# Patient Record
Sex: FEMALE | Race: OTHER | Employment: FULL TIME | URBAN - METROPOLITAN AREA
[De-identification: names, ages, dates, MRNs, and addresses within clinical notes are randomized per-mention and may not be internally consistent; named-entity substitution may affect disease eponyms.]

---

## 2019-04-19 PROCEDURE — 87086 URINE CULTURE/COLONY COUNT: CPT | Performed by: PHYSICIAN ASSISTANT

## 2019-04-30 PROCEDURE — 87591 N.GONORRHOEAE DNA AMP PROB: CPT | Performed by: UROLOGY

## 2019-04-30 PROCEDURE — 87491 CHLMYD TRACH DNA AMP PROBE: CPT | Performed by: UROLOGY

## 2019-04-30 PROCEDURE — 87798 DETECT AGENT NOS DNA AMP: CPT | Performed by: UROLOGY

## 2019-04-30 PROCEDURE — 87086 URINE CULTURE/COLONY COUNT: CPT | Performed by: UROLOGY

## 2021-08-31 ENCOUNTER — APPOINTMENT (OUTPATIENT)
Dept: OCCUPATIONAL MEDICINE | Age: 46
End: 2021-08-31
Attending: FAMILY MEDICINE

## 2022-01-19 ENCOUNTER — OFFICE VISIT (OUTPATIENT)
Dept: ENDOCRINOLOGY CLINIC | Facility: CLINIC | Age: 47
End: 2022-01-19
Payer: COMMERCIAL

## 2022-01-19 VITALS
DIASTOLIC BLOOD PRESSURE: 75 MMHG | WEIGHT: 170 LBS | HEIGHT: 60 IN | HEART RATE: 83 BPM | BODY MASS INDEX: 33.38 KG/M2 | SYSTOLIC BLOOD PRESSURE: 135 MMHG | RESPIRATION RATE: 18 BRPM

## 2022-01-19 DIAGNOSIS — L68.0 HIRSUTISM: ICD-10-CM

## 2022-01-19 DIAGNOSIS — E66.09 CLASS 1 OBESITY DUE TO EXCESS CALORIES WITH SERIOUS COMORBIDITY AND BODY MASS INDEX (BMI) OF 33.0 TO 33.9 IN ADULT: ICD-10-CM

## 2022-01-19 DIAGNOSIS — L65.9 ALOPECIA: Primary | ICD-10-CM

## 2022-01-19 DIAGNOSIS — N92.6 IRREGULAR MENSES: ICD-10-CM

## 2022-01-19 DIAGNOSIS — L83 ACANTHOSIS NIGRICANS: ICD-10-CM

## 2022-01-19 PROBLEM — H04.123 DRY EYES: Status: ACTIVE | Noted: 2018-05-22

## 2022-01-19 PROBLEM — S96.911A SPRAIN AND STRAIN OF RIGHT ANKLE: Status: ACTIVE | Noted: 2020-12-17

## 2022-01-19 PROBLEM — R94.39 ABNORMAL STRESS TEST: Status: ACTIVE | Noted: 2020-12-17

## 2022-01-19 PROBLEM — N63.20 LEFT BREAST MASS: Status: ACTIVE | Noted: 2019-10-29

## 2022-01-19 PROBLEM — E78.1 HYPERTRIGLYCERIDEMIA: Status: ACTIVE | Noted: 2020-12-17

## 2022-01-19 PROBLEM — E55.9 VITAMIN D DEFICIENCY: Status: ACTIVE | Noted: 2021-09-27

## 2022-01-19 PROBLEM — S05.12XA CONTUSION OF LEFT ORBIT: Status: ACTIVE | Noted: 2020-12-17

## 2022-01-19 PROBLEM — S93.401A SPRAIN AND STRAIN OF RIGHT ANKLE: Status: ACTIVE | Noted: 2020-12-17

## 2022-01-19 PROCEDURE — 3075F SYST BP GE 130 - 139MM HG: CPT | Performed by: INTERNAL MEDICINE

## 2022-01-19 PROCEDURE — 3008F BODY MASS INDEX DOCD: CPT | Performed by: INTERNAL MEDICINE

## 2022-01-19 PROCEDURE — 3078F DIAST BP <80 MM HG: CPT | Performed by: INTERNAL MEDICINE

## 2022-01-19 PROCEDURE — 99244 OFF/OP CNSLTJ NEW/EST MOD 40: CPT | Performed by: INTERNAL MEDICINE

## 2022-01-19 NOTE — H&P
New Patient Evaluation - History and Physical    CONSULT - Reason for Visit:  Hair Loss. Requesting Physician: Self- Referral.    CHIEF COMPLAINT:  Patient presents with:  Consult: for elevated testosterone.  Reports significant hair loss and extra facial standard drinks    Drug use: Never      FAMILY HISTORY:   History reviewed. No pertinent family history. CURRENT MEDICATIONS:    Current Outpatient Medications   Medication Sig Dispense Refill   • lisinopril 10 MG Oral Tab Take 10 mg by mouth daily. trachea midline, no adenopathy, thyroid symmetric, not enlarged and no tenderness  HEMATOLOGIC/LYMPHATICS:  no cervical lymphadenopathy and no supraclavicular lymphadenopathy  LUNGS: clear to auscultation bilaterally, no crackles or wheezing  CARDIOVASCULA

## 2022-01-21 PROBLEM — L68.0 HIRSUTISM: Status: ACTIVE | Noted: 2022-01-21

## 2022-01-21 PROBLEM — N92.6 IRREGULAR MENSES: Status: ACTIVE | Noted: 2022-01-21

## 2022-01-22 ENCOUNTER — LAB ENCOUNTER (OUTPATIENT)
Dept: LAB | Facility: HOSPITAL | Age: 47
End: 2022-01-22
Attending: INTERNAL MEDICINE
Payer: COMMERCIAL

## 2022-01-22 DIAGNOSIS — N92.6 IRREGULAR MENSES: ICD-10-CM

## 2022-01-22 DIAGNOSIS — L65.9 ALOPECIA: ICD-10-CM

## 2022-01-22 DIAGNOSIS — L68.0 HIRSUTISM: ICD-10-CM

## 2022-01-22 LAB
ALBUMIN SERPL-MCNC: 3.7 G/DL (ref 3.4–5)
ALBUMIN/GLOB SERPL: 1 {RATIO} (ref 1–2)
ALP LIVER SERPL-CCNC: 59 U/L
ALT SERPL-CCNC: 31 U/L
ANION GAP SERPL CALC-SCNC: 6 MMOL/L (ref 0–18)
AST SERPL-CCNC: 14 U/L (ref 15–37)
BASOPHILS # BLD AUTO: 0.03 X10(3) UL (ref 0–0.2)
BASOPHILS NFR BLD AUTO: 0.5 %
BILIRUB SERPL-MCNC: 0.5 MG/DL (ref 0.1–2)
BUN BLD-MCNC: 13 MG/DL (ref 7–18)
BUN/CREAT SERPL: 15.3 (ref 10–20)
CALCIUM BLD-MCNC: 8.9 MG/DL (ref 8.5–10.1)
CHLORIDE SERPL-SCNC: 107 MMOL/L (ref 98–112)
CO2 SERPL-SCNC: 28 MMOL/L (ref 21–32)
CORTIS SERPL-MCNC: 5.6 UG/DL
CREAT BLD-MCNC: 0.85 MG/DL
DEPRECATED RDW RBC AUTO: 42.2 FL (ref 35.1–46.3)
DHEA-S SERPL-MCNC: 152.3 UG/DL
EOSINOPHIL # BLD AUTO: 0.28 X10(3) UL (ref 0–0.7)
EOSINOPHIL NFR BLD AUTO: 4.8 %
ERYTHROCYTE [DISTWIDTH] IN BLOOD BY AUTOMATED COUNT: 12.9 % (ref 11–15)
FASTING STATUS PATIENT QL REPORTED: YES
GLOBULIN PLAS-MCNC: 3.8 G/DL (ref 2.8–4.4)
GLUCOSE BLD-MCNC: 102 MG/DL (ref 70–99)
HCT VFR BLD AUTO: 48.5 %
HGB BLD-MCNC: 16.1 G/DL
IMM GRANULOCYTES # BLD AUTO: 0.01 X10(3) UL (ref 0–1)
IMM GRANULOCYTES NFR BLD: 0.2 %
LYMPHOCYTES # BLD AUTO: 1.57 X10(3) UL (ref 1–4)
LYMPHOCYTES NFR BLD AUTO: 26.7 %
MCH RBC QN AUTO: 29.4 PG (ref 26–34)
MCHC RBC AUTO-ENTMCNC: 33.2 G/DL (ref 31–37)
MCV RBC AUTO: 88.5 FL
MONOCYTES # BLD AUTO: 0.32 X10(3) UL (ref 0.1–1)
MONOCYTES NFR BLD AUTO: 5.5 %
NEUTROPHILS # BLD AUTO: 3.66 X10 (3) UL (ref 1.5–7.7)
NEUTROPHILS # BLD AUTO: 3.66 X10(3) UL (ref 1.5–7.7)
NEUTROPHILS NFR BLD AUTO: 62.3 %
OSMOLALITY SERPL CALC.SUM OF ELEC: 292 MOSM/KG (ref 275–295)
PLATELET # BLD AUTO: 247 10(3)UL (ref 150–450)
POTASSIUM SERPL-SCNC: 4.8 MMOL/L (ref 3.5–5.1)
PROLACTIN SERPL-MCNC: 8.6 NG/ML
PROT SERPL-MCNC: 7.5 G/DL (ref 6.4–8.2)
RBC # BLD AUTO: 5.48 X10(6)UL
SODIUM SERPL-SCNC: 141 MMOL/L (ref 136–145)
VIT B12 SERPL-MCNC: 534 PG/ML (ref 193–986)
VIT D+METAB SERPL-MCNC: 23.5 NG/ML (ref 30–100)
WBC # BLD AUTO: 5.9 X10(3) UL (ref 4–11)

## 2022-01-22 PROCEDURE — 82306 VITAMIN D 25 HYDROXY: CPT

## 2022-01-22 PROCEDURE — 36415 COLL VENOUS BLD VENIPUNCTURE: CPT

## 2022-01-22 PROCEDURE — 84146 ASSAY OF PROLACTIN: CPT

## 2022-01-22 PROCEDURE — 83498 ASY HYDROXYPROGESTERONE 17-D: CPT

## 2022-01-22 PROCEDURE — 80053 COMPREHEN METABOLIC PANEL: CPT

## 2022-01-22 PROCEDURE — 82607 VITAMIN B-12: CPT

## 2022-01-22 PROCEDURE — 82024 ASSAY OF ACTH: CPT

## 2022-01-22 PROCEDURE — 85025 COMPLETE CBC W/AUTO DIFF WBC: CPT

## 2022-01-22 PROCEDURE — 82533 TOTAL CORTISOL: CPT

## 2022-01-22 PROCEDURE — 82627 DEHYDROEPIANDROSTERONE: CPT

## 2022-01-22 PROCEDURE — 84403 ASSAY OF TOTAL TESTOSTERONE: CPT

## 2022-01-22 PROCEDURE — 84402 ASSAY OF FREE TESTOSTERONE: CPT

## 2022-01-24 LAB — ADRENOCORTICOTROPIC HORMONE: 21.7 PG/ML

## 2022-01-27 ENCOUNTER — TELEPHONE (OUTPATIENT)
Dept: ENDOCRINOLOGY CLINIC | Facility: CLINIC | Age: 47
End: 2022-01-27

## 2022-01-27 DIAGNOSIS — E53.8 VITAMIN B12 DEFICIENCY: ICD-10-CM

## 2022-01-27 DIAGNOSIS — E55.9 VITAMIN D DEFICIENCY: ICD-10-CM

## 2022-01-27 DIAGNOSIS — E27.40 LOW SERUM CORTISOL LEVEL (HCC): ICD-10-CM

## 2022-01-27 DIAGNOSIS — L65.9 ALOPECIA: Primary | ICD-10-CM

## 2022-01-27 RX ORDER — ERGOCALCIFEROL (VITAMIN D2) 1250 MCG
50000 CAPSULE ORAL WEEKLY
Qty: 12 CAPSULE | Refills: 0 | Status: SHIPPED | OUTPATIENT
Start: 2022-01-27 | End: 2022-04-27

## 2022-01-27 NOTE — TELEPHONE ENCOUNTER
Hi!    Can we please let the patient know that I have reviewed her blood tests and that testosterone and 17-OH progesterone levels are still pending.     DHEAS, prolactin, liver and kidney function tests are normal.     Her vitamin B12 level is on the low s

## 2022-01-27 NOTE — TELEPHONE ENCOUNTER
Please advise   Testosterone and 17 alpha hydroxy still pending.     Component      Latest Ref Rng & Units 1/22/2022   WBC      4.0 - 11.0 x10(3) uL 5.9   RBC      3.80 - 5.30 x10(6)uL 5.48 (H)   Hemoglobin      12.0 - 16.0 g/dL 16.1 (H)   Hematocrit      3 B12      193 - 986 pg/mL 534   Cortisol      ug/dL 5.6   ADRENOCORTICOTROPIC HORMONE      7.2 - 63.3 pg/mL 21.7   Prolactin      2.2 - 31.5 ng/mL 8.6   VITAMIN D, 25-OH, TOTAL      30.0 - 100.0 ng/mL 23.5 (L)

## 2022-01-28 PROBLEM — R79.89 LOW SERUM CORTISOL LEVEL: Status: ACTIVE | Noted: 2022-01-28

## 2022-01-28 PROBLEM — E27.40 LOW SERUM CORTISOL LEVEL (HCC): Status: ACTIVE | Noted: 2022-01-28

## 2022-01-28 LAB
17-HYDROPROGESTERONE QNT: 37.2 NG/DL
TESTOSTERONE, FREE, S: 12.8 NG/DL
TESTOSTERONE, TOTAL, S: 440 NG/DL

## 2022-01-28 NOTE — TELEPHONE ENCOUNTER
rn called patient with message below, verbalized understanding of all instructions. ACTh test order faxed to infusion center.

## 2022-01-30 ENCOUNTER — TELEPHONE (OUTPATIENT)
Dept: ENDOCRINOLOGY CLINIC | Facility: CLINIC | Age: 47
End: 2022-01-30

## 2022-01-30 DIAGNOSIS — E34.8 HYPERANDROGENEMIA SYNDROME, FEMALE, POST PUBERTAL: Primary | ICD-10-CM

## 2022-01-30 DIAGNOSIS — E55.9 VITAMIN D DEFICIENCY: ICD-10-CM

## 2022-01-31 NOTE — TELEPHONE ENCOUNTER
Hi!    Can we please call this patient and let her know that her total testosterone is elevated to a very high level. It is very important for us to take a look at her ovaries to look for any abnormality present. I would like to order a pelvic US.     Thank

## 2022-02-08 ENCOUNTER — TELEPHONE (OUTPATIENT)
Dept: HEMATOLOGY/ONCOLOGY | Facility: HOSPITAL | Age: 47
End: 2022-02-08

## 2022-02-17 ENCOUNTER — HOSPITAL ENCOUNTER (OUTPATIENT)
Dept: ULTRASOUND IMAGING | Facility: HOSPITAL | Age: 47
Discharge: HOME | End: 2022-02-17
Attending: INTERNAL MEDICINE
Payer: COMMERCIAL

## 2022-02-17 DIAGNOSIS — E28.1 HYPERANDROGENEMIA SYNDROME, FEMALE, POST PUBERTAL: ICD-10-CM

## 2022-02-17 PROCEDURE — 76830 TRANSVAGINAL US NON-OB: CPT | Performed by: INTERNAL MEDICINE

## 2022-02-17 PROCEDURE — 76856 US EXAM PELVIC COMPLETE: CPT | Performed by: INTERNAL MEDICINE

## 2022-02-21 ENCOUNTER — TELEPHONE (OUTPATIENT)
Dept: ENDOCRINOLOGY CLINIC | Facility: CLINIC | Age: 47
End: 2022-02-21

## 2022-02-22 ENCOUNTER — OFFICE VISIT (OUTPATIENT)
Dept: HEMATOLOGY/ONCOLOGY | Facility: HOSPITAL | Age: 47
End: 2022-02-22
Attending: INTERNAL MEDICINE
Payer: COMMERCIAL

## 2022-02-22 VITALS
TEMPERATURE: 98 F | SYSTOLIC BLOOD PRESSURE: 116 MMHG | DIASTOLIC BLOOD PRESSURE: 73 MMHG | HEART RATE: 63 BPM | RESPIRATION RATE: 18 BRPM

## 2022-02-22 DIAGNOSIS — E27.40 LOW SERUM CORTISOL LEVEL (HCC): Primary | ICD-10-CM

## 2022-02-22 LAB
CORTIS SERPL-MCNC: 27.9 UG/DL
CORTIS SERPL-MCNC: 8.5 UG/DL

## 2022-02-22 PROCEDURE — 82533 TOTAL CORTISOL: CPT

## 2022-02-22 PROCEDURE — 99211 OFF/OP EST MAY X REQ PHY/QHP: CPT

## 2022-02-22 PROCEDURE — 96374 THER/PROPH/DIAG INJ IV PUSH: CPT

## 2022-02-22 PROCEDURE — 82024 ASSAY OF ACTH: CPT

## 2022-02-22 RX ORDER — COSYNTROPIN 0.25 MG/ML
INJECTION, POWDER, FOR SOLUTION INTRAMUSCULAR; INTRAVENOUS
Status: COMPLETED
Start: 2022-02-22 | End: 2022-02-22

## 2022-02-22 RX ORDER — COSYNTROPIN 0.25 MG/ML
0.25 INJECTION, POWDER, FOR SOLUTION INTRAMUSCULAR; INTRAVENOUS ONCE
Status: COMPLETED | OUTPATIENT
Start: 2022-02-22 | End: 2022-02-22

## 2022-02-22 RX ORDER — COSYNTROPIN 0.25 MG/ML
0.25 INJECTION, POWDER, FOR SOLUTION INTRAMUSCULAR; INTRAVENOUS ONCE
Status: CANCELLED | OUTPATIENT
Start: 2022-02-22 | End: 2022-02-22

## 2022-02-22 RX ADMIN — COSYNTROPIN 0.25 MG: 0.25 INJECTION, POWDER, FOR SOLUTION INTRAMUSCULAR; INTRAVENOUS at 07:47:00

## 2022-02-22 NOTE — TELEPHONE ENCOUNTER
Hi!    Can we please call this patient and let her know that I have reviewed her pelvic ultrasound and it does not show any abnormality in her ovaries that we can see. The next step is for us to obtain a CT scan of the abdomen and pelvis to see if the elevated testosterone could be coming from the adrenals. Thank you!

## 2022-02-22 NOTE — PROGRESS NOTES
Pt to infusion for ACTH stimulation test.  Pt arrived ambulatory. Speaks Frisian. Language Line  #[id number] Kunal Agustin used to explain test to patient. Pt verbalized understanding. PIV established in [facility] with good blood return. Baseline ACTH and cortisol levels drawn. Cortrosyn administered slow IVP. Cortisol levels drawn via PIV 30 minutes and 60 minutes post Cortrosyn. PIV flushed and removed. Site covered with gauze and coban. Pt instructed to follow up with ordering MD for lab results. Pt discharged stable and ambulatory.

## 2022-02-23 NOTE — TELEPHONE ENCOUNTER
Attempted to contact patient again. Left message notifying patient (HIPPA verified). Advised her that CT order is in and she may call central scheduling to arrange CT. Encouraged her to call with questions or concerns.

## 2022-02-28 ENCOUNTER — TELEPHONE (OUTPATIENT)
Dept: ENDOCRINOLOGY CLINIC | Facility: CLINIC | Age: 47
End: 2022-02-28

## 2022-02-28 NOTE — TELEPHONE ENCOUNTER
Hi!  Can we please notify patient that her ACTH stimulation has ruled out completely adrenal insufficiency? Thank you!

## 2022-03-04 NOTE — TELEPHONE ENCOUNTER
Patient was notified with understanding via language line . I advised the patient to complete CT scan ordered.   Dr. Boo Pedro, would you like to see the patient back after her CT?

## 2022-03-12 ENCOUNTER — HOSPITAL ENCOUNTER (OUTPATIENT)
Dept: CT IMAGING | Facility: HOSPITAL | Age: 47
Discharge: HOME | End: 2022-03-12
Attending: INTERNAL MEDICINE
Payer: COMMERCIAL

## 2022-03-12 DIAGNOSIS — E28.1 HYPERANDROGENEMIA SYNDROME, FEMALE, POST PUBERTAL: ICD-10-CM

## 2022-03-12 LAB — CREAT BLD-MCNC: 0.9 MG/DL

## 2022-03-12 PROCEDURE — 82565 ASSAY OF CREATININE: CPT

## 2022-03-12 PROCEDURE — 74178 CT ABD&PLV WO CNTR FLWD CNTR: CPT | Performed by: INTERNAL MEDICINE

## 2022-04-27 ENCOUNTER — TELEPHONE (OUTPATIENT)
Dept: ENDOCRINOLOGY CLINIC | Facility: CLINIC | Age: 47
End: 2022-04-27

## 2022-04-27 ENCOUNTER — OFFICE VISIT (OUTPATIENT)
Dept: ENDOCRINOLOGY CLINIC | Facility: CLINIC | Age: 47
End: 2022-04-27
Payer: COMMERCIAL

## 2022-04-27 VITALS
HEIGHT: 60 IN | WEIGHT: 170 LBS | DIASTOLIC BLOOD PRESSURE: 66 MMHG | RESPIRATION RATE: 16 BRPM | BODY MASS INDEX: 33.38 KG/M2 | SYSTOLIC BLOOD PRESSURE: 108 MMHG | HEART RATE: 82 BPM

## 2022-04-27 DIAGNOSIS — L83 ACANTHOSIS NIGRICANS: ICD-10-CM

## 2022-04-27 DIAGNOSIS — L65.9 ALOPECIA: ICD-10-CM

## 2022-04-27 DIAGNOSIS — R79.89 ELEVATED TESTOSTERONE LEVEL IN FEMALE: Primary | ICD-10-CM

## 2022-04-27 PROCEDURE — 99214 OFFICE O/P EST MOD 30 MIN: CPT | Performed by: INTERNAL MEDICINE

## 2022-04-27 PROCEDURE — 3074F SYST BP LT 130 MM HG: CPT | Performed by: INTERNAL MEDICINE

## 2022-04-27 PROCEDURE — 3008F BODY MASS INDEX DOCD: CPT | Performed by: INTERNAL MEDICINE

## 2022-04-27 PROCEDURE — 3078F DIAST BP <80 MM HG: CPT | Performed by: INTERNAL MEDICINE

## 2022-04-28 NOTE — TELEPHONE ENCOUNTER
Hi Dr. Javan Spaulding! I was wondering if you could take a very close look at this patient's pelvic US and see if there is any evidence of an ovarian tumor? She has a testosterone of 440 and is pre-menopausal.     Thank you!     Steve Norton MD

## 2022-05-09 ENCOUNTER — TELEPHONE (OUTPATIENT)
Dept: ADMINISTRATIVE | Age: 47
End: 2022-05-09

## 2022-06-09 ENCOUNTER — ORDER TRANSCRIPTION (OUTPATIENT)
Dept: ADMINISTRATIVE | Facility: HOSPITAL | Age: 47
End: 2022-06-09

## 2022-06-09 DIAGNOSIS — R20.2 PARESTHESIA OF SKIN: Primary | ICD-10-CM

## 2022-07-11 ENCOUNTER — LAB ENCOUNTER (OUTPATIENT)
Dept: LAB | Facility: HOSPITAL | Age: 47
End: 2022-07-11
Attending: INTERNAL MEDICINE
Payer: COMMERCIAL

## 2022-07-11 DIAGNOSIS — E28.8 OTHER OVARIAN DYSFUNCTION: Primary | ICD-10-CM

## 2022-07-11 DIAGNOSIS — L68.0 HIRSUTISM: ICD-10-CM

## 2022-07-11 LAB
ANION GAP SERPL CALC-SCNC: 6 MMOL/L (ref 0–18)
BUN BLD-MCNC: 13 MG/DL (ref 7–18)
BUN/CREAT SERPL: 13.7 (ref 10–20)
CALCIUM BLD-MCNC: 9.2 MG/DL (ref 8.5–10.1)
CHLORIDE SERPL-SCNC: 104 MMOL/L (ref 98–112)
CO2 SERPL-SCNC: 27 MMOL/L (ref 21–32)
CREAT BLD-MCNC: 0.95 MG/DL
FASTING STATUS PATIENT QL REPORTED: YES
GLUCOSE BLD-MCNC: 112 MG/DL (ref 70–99)
OSMOLALITY SERPL CALC.SUM OF ELEC: 285 MOSM/KG (ref 275–295)
POTASSIUM SERPL-SCNC: 3.8 MMOL/L (ref 3.5–5.1)
SODIUM SERPL-SCNC: 137 MMOL/L (ref 136–145)
TESTOST SERPL-MCNC: 381.14 NG/DL

## 2022-07-11 PROCEDURE — 80048 BASIC METABOLIC PNL TOTAL CA: CPT

## 2022-07-11 PROCEDURE — 36415 COLL VENOUS BLD VENIPUNCTURE: CPT

## 2022-07-11 PROCEDURE — 84403 ASSAY OF TOTAL TESTOSTERONE: CPT

## 2022-07-27 ENCOUNTER — OFFICE VISIT (OUTPATIENT)
Dept: OBGYN CLINIC | Facility: CLINIC | Age: 47
End: 2022-07-27
Payer: COMMERCIAL

## 2022-07-27 VITALS
DIASTOLIC BLOOD PRESSURE: 60 MMHG | HEIGHT: 60 IN | SYSTOLIC BLOOD PRESSURE: 110 MMHG | WEIGHT: 172 LBS | BODY MASS INDEX: 33.77 KG/M2 | RESPIRATION RATE: 17 BRPM | HEART RATE: 80 BPM

## 2022-07-27 DIAGNOSIS — E25.9 VIRILIZATION (HCC): Primary | ICD-10-CM

## 2022-07-27 PROCEDURE — 3078F DIAST BP <80 MM HG: CPT | Performed by: OBSTETRICS & GYNECOLOGY

## 2022-07-27 PROCEDURE — 3008F BODY MASS INDEX DOCD: CPT | Performed by: OBSTETRICS & GYNECOLOGY

## 2022-07-27 PROCEDURE — 3074F SYST BP LT 130 MM HG: CPT | Performed by: OBSTETRICS & GYNECOLOGY

## 2022-07-27 PROCEDURE — 99203 OFFICE O/P NEW LOW 30 MIN: CPT | Performed by: OBSTETRICS & GYNECOLOGY

## 2022-11-15 ENCOUNTER — MED REC SCAN ONLY (OUTPATIENT)
Dept: OBGYN CLINIC | Facility: CLINIC | Age: 47
End: 2022-11-15

## 2024-11-07 ENCOUNTER — TELEPHONE (OUTPATIENT)
Dept: HEMATOLOGY/ONCOLOGY | Facility: HOSPITAL | Age: 49
End: 2024-11-07

## 2024-11-07 NOTE — TELEPHONE ENCOUNTER
Nancy cordero consult dx partial PT increased Dr Shaan Stack referring. Please advise. Thank you nancy

## 2024-11-08 ENCOUNTER — TELEPHONE (OUTPATIENT)
Dept: HEMATOLOGY/ONCOLOGY | Facility: HOSPITAL | Age: 49
End: 2024-11-08

## 2024-11-12 ENCOUNTER — TELEPHONE (OUTPATIENT)
Dept: HEMATOLOGY/ONCOLOGY | Facility: HOSPITAL | Age: 49
End: 2024-11-12

## 2024-11-12 NOTE — TELEPHONE ENCOUNTER
I reached out three times to the pt to make consult no reply back. I will not reach out again . grant

## 2024-11-15 ENCOUNTER — APPOINTMENT (OUTPATIENT)
Dept: HEMATOLOGY/ONCOLOGY | Facility: HOSPITAL | Age: 49
End: 2024-11-15
Attending: INTERNAL MEDICINE
Payer: COMMERCIAL

## 2024-11-18 ENCOUNTER — OFFICE VISIT (OUTPATIENT)
Dept: HEMATOLOGY/ONCOLOGY | Facility: HOSPITAL | Age: 49
End: 2024-11-18
Attending: INTERNAL MEDICINE
Payer: COMMERCIAL

## 2024-11-18 VITALS
RESPIRATION RATE: 16 BRPM | SYSTOLIC BLOOD PRESSURE: 137 MMHG | BODY MASS INDEX: 33.83 KG/M2 | DIASTOLIC BLOOD PRESSURE: 73 MMHG | OXYGEN SATURATION: 97 % | WEIGHT: 172.31 LBS | HEART RATE: 65 BPM | HEIGHT: 60 IN | TEMPERATURE: 98 F

## 2024-11-18 DIAGNOSIS — R79.1 ELEVATED PARTIAL THROMBOPLASTIN TIME (PTT): Primary | ICD-10-CM

## 2024-11-18 DIAGNOSIS — R23.3 EASY BRUISING: ICD-10-CM

## 2024-11-18 LAB
APTT PPP: 31.9 SECONDS (ref 23–36)
INR BLD: 0.94 (ref 0.8–1.2)
PROTHROMBIN TIME: 13.1 SECONDS (ref 11.6–14.8)

## 2024-11-18 PROCEDURE — 99204 OFFICE O/P NEW MOD 45 MIN: CPT | Performed by: INTERNAL MEDICINE

## 2024-11-18 NOTE — CONSULTS
Regarding [facility] Hematology  Consultation Note    Patient Name: Nelly Garcia   YOB: 1975   Medical Record Number: [id number]   CSN: [id number]   Consulting Physician: Bright Marte MD  Referring Physician(s): Dr Shaan Stack MD  Date of Consultation: 11/18/2024     Reason for Consultation:    1. Elevated partial thromboplastin time (PTT)    2. Easy bruising      History of Present Illness:   Nelly Garcia is a 49 year old female seen today in the Hematology Clinic  for elevated PTT. CBC done on 10/24/24 was normal. PTT on 10/2 2624 was elevated 47 seconds.  Pro time was 13.2 seconds.  [INR 1.1] repeat PTT on 11/2/2024 was elevated at 40 seconds.  A mixing study was performed that showed PTT was only minimally elevated at 32 seconds.  Hence mixing study was not performed  Her LFTs were mildly elevated.  But acute hepatitis panel was unremarkable.  An ultrasound of the liver was been ordered by her primary care physician and is pending.    She previously had menorrhagia but underwent total abdominal hysterectomy with bilateral salpingo-oophorectomy at [facility] and pathology showed a Leydig cell tumor.  She denies any postop surgical bleeding.  She has been having epistaxis as well as occasional bleeding gums, which prompted the above testing.She  has not received blood transfusion before. She has noted no swollen glands in neck, axillary or inguinal areas, no fevers, weight loss, night sweats, anorexia or pruritus. There is no family history of any hematologic neoplasm.      She denies hematuria, melana or BRBPR.  She denies family history of bleeding and clotting diathesis and recurrent miscarriages.        Past Medical History:  Past Medical History:    Hypertension       Past Surgical History:  Past Surgical History:   Procedure Laterality Date    Colonoscopy  1/29/19= Diverticulosis, Hemorrhoids    Repeat 2029    Other surgical history  04/30/2019    Cystoscopy Dr. Kee      Upper gi endoscopy performed  19= Normal    Upper gi endoscopy,biopsy  10/25/17=Gastritis. H pylori negative       Family Medical History:  No family history on file.    Gyne History:  OB History    Para Term  AB Living   3 3 2 1 0 33   SAB IAB Ectopic Multiple Live Births   0 0 0 0 0       Social History:  Social History     Socioeconomic History    Marital status:      Spouse name: Not on file    Number of children: Not on file    Years of education: Not on file    Highest education level: Not on file   Occupational History    Not on file   Tobacco Use    Smoking status: Never    Smokeless tobacco: Never   Vaping Use    Vaping status: Never Used   Substance and Sexual Activity    Alcohol use: Not Currently     Alcohol/week: 0.0 standard drinks of alcohol    Drug use: Never    Sexual activity: Not on file   Other Topics Concern    Not on file   Social History Narrative    Not on file     Social Drivers of Health     Financial Resource Strain: Not on file   Food Insecurity: No Food Insecurity (3/11/2022)    Received from [facility]    Food Insecurity     Within the past 30 days, I worried whether my food would run out before I got money to buy more. / En los últimos 30 días, me preocupó que la comida se podía acabar antes de tener dinero para compr...: Never true / Nunca     Within the past 30 days, the food that I bought just didn't last, and I didn't have money to get more. / En los últimos 30 días, La comida que compré no rindió lo suficiente, y no tenía dinero para...: Never true / Nunca   Transportation Needs: Not on file   Physical Activity: Not on file   Stress: Not on file   Social Connections: Not on file   Housing Stability: Not on file       Allergies:   Allergies[1]    Current Medications:  No outpatient medications have been marked as taking for the 24 encounter (Office Visit) with Bright Marte MD.       Review of Systems:  Pertinent positives and  negatives noted in the the HPI.     Physical Examination:  /73 (BP Location: Left arm, Patient Position: Sitting, Cuff Size: adult)   Pulse 65   Temp 98.1 °F (36.7 °C) (Oral)   Resp 16   Ht 1.524 m (5')   Wt 78.2 kg (172 lb 4.8 oz)   SpO2 97%   BMI 33.65 kg/m²     General: Patient is alert and oriented x 3, not in acute distress.  HEENT: EOMs intact. PERRL. Oropharynx is clear.   Neck: No JVD. No palpable lymphadenopathy. Neck is supple.  Lymphatics: There is no palpable lymphadenopathy throughout in the cervical, supraclavicular, axillary, or inguinal regions.  Chest: Clear to auscultation. No wheezes or rales.  Heart: Regular rate and rhythm. S1S2 normal.  Extremities: No edema or calf tenderness.  As of the  Neurological: Grossly intact.       Labs:    Lab Results   Component Value Date/Time    WBC 5.9 01/22/2022 09:13 AM    RBC 5.48 (H) 01/22/2022 09:13 AM    HGB 16.1 (H) 01/22/2022 09:13 AM    HCT 48.5 (H) 01/22/2022 09:13 AM    MCV 88.5 01/22/2022 09:13 AM    MCH 29.4 01/22/2022 09:13 AM    MCHC 33.2 01/22/2022 09:13 AM    RDW 12.9 01/22/2022 09:13 AM    NEPRELIM 3.66 01/22/2022 09:13 AM    .0 01/22/2022 09:13 AM               Impression:  Diagnosis  1. Elevated partial thromboplastin time (PTT)    2. Easy bruising    49-year-old female with epistaxis and gum bleeding found to have a mildly elevated PTT.  Mixing study was unable to be performed due to minimally elevated PTT.     Plan:  Repeat PTT  Check Von Willebrand panel as well as lupus anticoagulant  Will contact pt with the results of the above    Thank you Dr Shaan Stack MD  for the opportunity to participate in the care of this interesting patient. Please do contact me if I may be of any further assistance    Bright Marte MD  [facility] Hematology/Oncology           [1] No Known Allergies

## 2024-11-19 LAB
APTT PPP: 31.6 SECONDS (ref 23–36)
CONFIRM APTT STACLOT: NEGATIVE
CONFIRM DRVVT: 0.9 S (ref 0–1.1)
PROTHROMBIN TIME: 12.7 SECONDS (ref 11.6–14.8)

## 2024-11-20 LAB
B2 GLYCOPROT1 IGG SERPL IA-ACNC: <0.8 U/ML (ref ?–7)
B2 GLYCOPROT1 IGM SERPL IA-ACNC: 2.8 U/ML (ref ?–7)
CARDIOLIPIN IGG SERPL-ACNC: 1.1 GPL (ref ?–10)
CARDIOLIPIN IGM SERPL-ACNC: 6.4 MPL (ref ?–10)
FACTOR VIII ACT: 116 %
VWF ACTIVITY: 124 %
VWF AG: 191 %

## 2024-11-21 ENCOUNTER — TELEPHONE (OUTPATIENT)
Dept: HEMATOLOGY/ONCOLOGY | Facility: HOSPITAL | Age: 49
End: 2024-11-21

## 2024-11-22 ENCOUNTER — TELEPHONE (OUTPATIENT)
Dept: HEMATOLOGY/ONCOLOGY | Facility: HOSPITAL | Age: 49
End: 2024-11-22

## 2024-11-27 ENCOUNTER — TELEPHONE (OUTPATIENT)
Dept: HEMATOLOGY/ONCOLOGY | Facility: HOSPITAL | Age: 49
End: 2024-11-27

## 2024-11-27 NOTE — TELEPHONE ENCOUNTER
Using Yemeni Interpretor, #[id number], called Nelly, Per Dr. Marte, Recent labs were normal, PTT is normal suggest prior tests were inaccurate. She does not have any bleeding disorder. If epistaxis continues, recommend ENT evaluation. Pt voiced understanding and thanked me for the call.

## 2025-03-12 ENCOUNTER — APPOINTMENT (OUTPATIENT)
Dept: GENERAL RADIOLOGY | Facility: HOSPITAL | Age: 50
End: 2025-03-12
Attending: EMERGENCY MEDICINE
Payer: COMMERCIAL

## 2025-03-12 ENCOUNTER — HOSPITAL ENCOUNTER (INPATIENT)
Facility: HOSPITAL | Age: 50
LOS: 1 days | Discharge: HOME | End: 2025-03-13
Attending: EMERGENCY MEDICINE | Admitting: HOSPITALIST
Payer: COMMERCIAL

## 2025-03-12 ENCOUNTER — LAB REQUISITION (OUTPATIENT)
Dept: LAB | Facility: HOSPITAL | Age: 50
End: 2025-03-12
Payer: COMMERCIAL

## 2025-03-12 ENCOUNTER — APPOINTMENT (OUTPATIENT)
Dept: INTERVENTIONAL RADIOLOGY/VASCULAR | Facility: HOSPITAL | Age: 50
End: 2025-03-12
Attending: INTERNAL MEDICINE
Payer: COMMERCIAL

## 2025-03-12 DIAGNOSIS — I21.3 ST ELEVATION MYOCARDIAL INFARCTION (STEMI), UNSPECIFIED ARTERY (HCC): Primary | ICD-10-CM

## 2025-03-12 DIAGNOSIS — K82.9 DISEASE OF GALLBLADDER, UNSPECIFIED: ICD-10-CM

## 2025-03-12 DIAGNOSIS — K80.20 CALCULUS OF GALLBLADDER WITHOUT CHOLECYSTITIS WITHOUT OBSTRUCTION: ICD-10-CM

## 2025-03-12 LAB
ALBUMIN SERPL-MCNC: 3.8 G/DL (ref 3.2–4.8)
ALBUMIN/GLOB SERPL: 1.3 {RATIO} (ref 1–2)
ALP LIVER SERPL-CCNC: 92 U/L
ALT SERPL-CCNC: 152 U/L
ANION GAP SERPL CALC-SCNC: 9 MMOL/L (ref 0–18)
AST SERPL-CCNC: 126 U/L (ref ?–34)
ATRIAL RATE: 66 BPM
ATRIAL RATE: 71 BPM
BASOPHILS # BLD AUTO: 0.02 X10(3) UL (ref 0–0.2)
BASOPHILS NFR BLD AUTO: 0.2 %
BILIRUB SERPL-MCNC: 0.4 MG/DL (ref 0.3–1.2)
BUN BLD-MCNC: 9 MG/DL (ref 9–23)
BUN/CREAT SERPL: 10.8 (ref 10–20)
CALCIUM BLD-MCNC: 8.4 MG/DL (ref 8.7–10.4)
CHLORIDE SERPL-SCNC: 107 MMOL/L (ref 98–112)
CO2 SERPL-SCNC: 25 MMOL/L (ref 21–32)
CREAT BLD-MCNC: 0.83 MG/DL
DEPRECATED RDW RBC AUTO: 41.3 FL (ref 35.1–46.3)
EGFRCR SERPLBLD CKD-EPI 2021: 86 ML/MIN/1.73M2 (ref 60–?)
EOSINOPHIL # BLD AUTO: 0 X10(3) UL (ref 0–0.7)
EOSINOPHIL NFR BLD AUTO: 0 %
ERYTHROCYTE [DISTWIDTH] IN BLOOD BY AUTOMATED COUNT: 12.7 % (ref 11–15)
GLOBULIN PLAS-MCNC: 2.9 G/DL (ref 2–3.5)
GLUCOSE BLD-MCNC: 140 MG/DL (ref 70–99)
HCT VFR BLD AUTO: 38.5 %
HGB BLD-MCNC: 13.3 G/DL
IMM GRANULOCYTES # BLD AUTO: 0.05 X10(3) UL (ref 0–1)
IMM GRANULOCYTES NFR BLD: 0.5 %
LYMPHOCYTES # BLD AUTO: 0.44 X10(3) UL (ref 1–4)
LYMPHOCYTES NFR BLD AUTO: 4.7 %
MCH RBC QN AUTO: 30.6 PG (ref 26–34)
MCHC RBC AUTO-ENTMCNC: 34.5 G/DL (ref 31–37)
MCV RBC AUTO: 88.7 FL
MONOCYTES # BLD AUTO: 0.06 X10(3) UL (ref 0.1–1)
MONOCYTES NFR BLD AUTO: 0.6 %
NEUTROPHILS # BLD AUTO: 8.84 X10 (3) UL (ref 1.5–7.7)
NEUTROPHILS # BLD AUTO: 8.84 X10(3) UL (ref 1.5–7.7)
NEUTROPHILS NFR BLD AUTO: 94 %
OSMOLALITY SERPL CALC.SUM OF ELEC: 293 MOSM/KG (ref 275–295)
P AXIS: 47 DEGREES
P AXIS: 48 DEGREES
P-R INTERVAL: 168 MS
P-R INTERVAL: 172 MS
PLATELET # BLD AUTO: 180 10(3)UL (ref 150–450)
POTASSIUM SERPL-SCNC: 3.4 MMOL/L (ref 3.5–5.1)
PROT SERPL-MCNC: 6.7 G/DL (ref 5.7–8.2)
Q-T INTERVAL: 454 MS
Q-T INTERVAL: 470 MS
QRS DURATION: 100 MS
QRS DURATION: 100 MS
QTC CALCULATION (BEZET): 492 MS
QTC CALCULATION (BEZET): 493 MS
R AXIS: 16 DEGREES
R AXIS: 19 DEGREES
RBC # BLD AUTO: 4.34 X10(6)UL
SODIUM SERPL-SCNC: 141 MMOL/L (ref 136–145)
T AXIS: -24 DEGREES
T AXIS: -36 DEGREES
TROPONIN I SERPL HS-MCNC: <3 NG/L
VENTRICULAR RATE: 66 BPM
VENTRICULAR RATE: 71 BPM
WBC # BLD AUTO: 9.4 X10(3) UL (ref 4–11)

## 2025-03-12 PROCEDURE — 4A023N7 MEASUREMENT OF CARDIAC SAMPLING AND PRESSURE, LEFT HEART, PERCUTANEOUS APPROACH: ICD-10-PCS | Performed by: INTERNAL MEDICINE

## 2025-03-12 PROCEDURE — 71045 X-RAY EXAM CHEST 1 VIEW: CPT | Performed by: EMERGENCY MEDICINE

## 2025-03-12 PROCEDURE — 99222 1ST HOSP IP/OBS MODERATE 55: CPT | Performed by: HOSPITALIST

## 2025-03-12 PROCEDURE — B2111ZZ FLUOROSCOPY OF MULTIPLE CORONARY ARTERIES USING LOW OSMOLAR CONTRAST: ICD-10-PCS | Performed by: INTERNAL MEDICINE

## 2025-03-12 PROCEDURE — 88304 TISSUE EXAM BY PATHOLOGIST: CPT | Performed by: SURGERY

## 2025-03-12 PROCEDURE — B2151ZZ FLUOROSCOPY OF LEFT HEART USING LOW OSMOLAR CONTRAST: ICD-10-PCS | Performed by: INTERNAL MEDICINE

## 2025-03-12 RX ORDER — TEMAZEPAM 15 MG/1
15 CAPSULE ORAL NIGHTLY PRN
Status: DISCONTINUED | OUTPATIENT
Start: 2025-03-12 | End: 2025-03-13

## 2025-03-12 RX ORDER — HEPARIN SODIUM 1000 [USP'U]/ML
INJECTION, SOLUTION INTRAVENOUS; SUBCUTANEOUS
Status: COMPLETED
Start: 2025-03-12 | End: 2025-03-12

## 2025-03-12 RX ORDER — SODIUM CHLORIDE 9 MG/ML
1000 INJECTION, SOLUTION INTRAVENOUS ONCE
Status: COMPLETED | OUTPATIENT
Start: 2025-03-12 | End: 2025-03-12

## 2025-03-12 RX ORDER — ACETAMINOPHEN 500 MG
500 TABLET ORAL EVERY 4 HOURS PRN
Status: DISCONTINUED | OUTPATIENT
Start: 2025-03-12 | End: 2025-03-13

## 2025-03-12 RX ORDER — ONDANSETRON 2 MG/ML
4 INJECTION INTRAMUSCULAR; INTRAVENOUS EVERY 6 HOURS PRN
Status: DISCONTINUED | OUTPATIENT
Start: 2025-03-12 | End: 2025-03-13

## 2025-03-12 RX ORDER — ASPIRIN 81 MG/1
324 TABLET, CHEWABLE ORAL ONCE
Status: COMPLETED | OUTPATIENT
Start: 2025-03-12 | End: 2025-03-12

## 2025-03-12 RX ORDER — MIDAZOLAM HYDROCHLORIDE 1 MG/ML
INJECTION INTRAMUSCULAR; INTRAVENOUS
Status: COMPLETED
Start: 2025-03-12 | End: 2025-03-12

## 2025-03-12 RX ORDER — LIDOCAINE HYDROCHLORIDE 20 MG/ML
INJECTION, SOLUTION EPIDURAL; INFILTRATION; INTRACAUDAL; PERINEURAL
Status: COMPLETED
Start: 2025-03-12 | End: 2025-03-12

## 2025-03-12 RX ORDER — POTASSIUM CHLORIDE 1500 MG/1
40 TABLET, EXTENDED RELEASE ORAL ONCE
Status: COMPLETED | OUTPATIENT
Start: 2025-03-12 | End: 2025-03-12

## 2025-03-12 RX ORDER — ASPIRIN 81 MG/1
TABLET, CHEWABLE ORAL
Status: COMPLETED
Start: 2025-03-12 | End: 2025-03-12

## 2025-03-12 RX ORDER — PROCHLORPERAZINE EDISYLATE 5 MG/ML
5 INJECTION INTRAMUSCULAR; INTRAVENOUS EVERY 8 HOURS PRN
Status: DISCONTINUED | OUTPATIENT
Start: 2025-03-12 | End: 2025-03-13

## 2025-03-12 RX ORDER — IOPAMIDOL 612 MG/ML
85 INJECTION, SOLUTION INTRAVASCULAR
Status: COMPLETED | OUTPATIENT
Start: 2025-03-12 | End: 2025-03-12

## 2025-03-12 RX ORDER — NITROGLYCERIN 0.4 MG/1
0.4 TABLET SUBLINGUAL ONCE
Status: COMPLETED | OUTPATIENT
Start: 2025-03-12 | End: 2025-03-12

## 2025-03-12 NOTE — IVS NOTE
Hand-Off   Procedure hand off report given to Lena JOSE.   Pt's vital signs are stable. Pt on RA, a/o x 4.   Pt denies any pain or discomfort.  Procedural site dressing remains clean, dry and intact, no bleeding or swelling noted.  Activity restriction and bedrest status reviewed receiving DAMON Paredes.   Pt's dinner order placed and to be delivered to pt's assigned room on 3rd floor - Nurse made aware.    Dr. Ruiz spoke with patient/family post procedure.     Pt transferred with family and belongings

## 2025-03-12 NOTE — ED INITIAL ASSESSMENT (HPI)
Patient presents to the ED c/o chest pain starting today after cholecystomy at [facility].

## 2025-03-12 NOTE — H&P
[facility]    PATIENT'S NAME: YANETH ZULETA   ATTENDING PHYSICIAN: Jeremias Gray MD   PATIENT ACCOUNT#:   [id number]    LOCATION:  [place] [address]  MEDICAL RECORD #:   [id number]       YOB: 1975  ADMISSION DATE:       03/12/2025    HISTORY AND PHYSICAL EXAMINATION    CHIEF COMPLAINT:  Possible ST elevation myocardial infarction.    HISTORY OF PRESENT ILLNESS:  The patient is a 50-year-old  female who had elective laparoscopic cholecystectomy earlier today at an outpatient surgical center.  When she woke up from anesthesia started complaining of midsternal chest discomfort.  EKG showed anterior ST elevation and T wave inversion.  Sent to the emergency department for evaluation.  Arrived via cardiac alert and was taken to the catheterization lab by Cardiology.    PAST MEDICAL HISTORY:  Prediabetic state, obesity, hyperlipidemia, and hypertension.    PAST SURGICAL HISTORY:  Cystoscopy, diagnostic laparoscopy.  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  Pathology is benign.  Also today laparoscopic cholecystectomy.    MEDICATIONS:  Please see medication reconciliation list.     ALLERGIES:  No known drug allergies.    FAMILY HISTORY:  Positive for diabetes mellitus type 2 and hypertension.    SOCIAL HISTORY:  No tobacco, alcohol, or drug use.    REVIEW OF SYSTEMS:  The patient described midsternal chest pressure when she woke up from anesthesia.  No radiation.  She denies any prior history of similar pain.  Other 12-point review of systems negative.      PHYSICAL EXAMINATION:    GENERAL:  Alert, oriented to time, place, and person, mild distress.   VITAL SIGNS:  Temperature 98.0.  Pulse 92.  Respiratory rate 16.  Blood pressure 122/69.  Pulse ox 93% on room air.  HEENT:  Atraumatic.  Oropharynx clear.  Moist mucous membranes.  Ears, nose normal.  Eyes:  Anicteric  sclerae.  NECK:  Supple.  No lymphadenopathy.  Trachea midline.  Full range of motion.  LUNGS:  Clear to auscultation bilaterally.  Normal respiratory effort.  HEART:  Regular rate and rhythm.  S1 and S2 auscultated.  No murmur.  ABDOMEN:  Soft, nondistended.  Laparoscopic incisions.  EXTREMITIES:  No peripheral edema, clubbing, or cyanosis.  NEUROLOGIC:  Motor and sensory intact.    ASSESSMENT:    1.   Possible non-ST elevation myocardial infarction.  2.   Essential hypertension.  3.   Obesity.  4.   Prediabetic state.    PLAN:  The patient will be admitted to telemetry floor.  Cardiac angiogram per Cardiology.  Further recommendations pending cardiac angiogram results.    Dictated By Jad Medel MD  d: 03/12/2025 16:11:53  t: 03/12/2025 16:20:16  [facility] [id number]  FB/    cc: Jeremias Gray MD

## 2025-03-12 NOTE — PROGRESS NOTES
Pt alert and oriented x4. Standby assist. Pt admitted post cath. R groin site is clean, soft, and dry. Pt and family updated on plan of care. Plan to monitor. Safety precautions in place. Call light within reach.

## 2025-03-12 NOTE — ED PROVIDER NOTES
Patient Seen in: [facility] Emergency Department      History     Chief Complaint   Patient presents with    Chest Pain     Stated Complaint: chest pain with ekg changes from     Subjective:   I was told a cardiac alert called for she got back to the room.        51 y/o female who is prediabetic with hypertension here with chest discomfort and EKG changes after laparoscopic cholecystectomy.  Daughters in the room helping translate Bolivian.  She had surgery around 11 AM.  According to the daughter they noticed some EKG changes towards the end of surgery when she woke up from anesthesia she was complaining of chest pressure.  It is mild right now.  She does have some nausea.  No diaphoresis.  No cough or congestion.  She was fine going into the surgery.  Denies tobacco or family history of heart disease.  Patient's surgery was in [place],  drove her here.              Objective:     No pertinent past medical history.            No pertinent past surgical history.              No pertinent social history.                Physical Exam     ED Triage Vitals [03/12/25 1516]   /74   Pulse 69   Resp 16   Temp 98 °F (36.7 °C)   Temp src Oral   SpO2 93 %   O2 Device None (Room air)       Current Vitals:   Vital Signs  BP: 122/69  Pulse: 92  Resp: 16  Temp: 98 °F (36.7 °C)  Temp src: Oral  MAP (mmHg): 86    Oxygen Therapy  SpO2: 93 %  O2 Device: None (Room air)        Physical Exam  HENT:      Head: Normocephalic.      Mouth/Throat:      Mouth: Mucous membranes are moist.      Pharynx: Oropharynx is clear.   Eyes:      Extraocular Movements: Extraocular movements intact.      Pupils: Pupils are equal, round, and reactive to light.   Cardiovascular:      Rate and Rhythm: Normal rate and regular rhythm.      Heart sounds: Normal heart sounds.   Pulmonary:      Effort: Pulmonary effort is normal.      Breath sounds: Normal breath sounds.   Abdominal:      Palpations: Abdomen is soft.      Tenderness:  There is no abdominal tenderness.      Comments: Lap symone dressings in place   Musculoskeletal:         General: No swelling or tenderness. Normal range of motion.      Cervical back: Normal range of motion.   Lymphadenopathy:      Cervical: No cervical adenopathy.   Skin:     General: Skin is warm.      Capillary Refill: Capillary refill takes less than 2 seconds.   Neurological:      General: No focal deficit present.      Mental Status: She is alert.             ED Course     Labs Reviewed   COMP METABOLIC PANEL (14) - Abnormal; Notable for the following components:       Result Value    Glucose 140 (*)     Potassium 3.4 (*)     Calcium, Total 8.4 (*)      (*)      (*)     All other components within normal limits   CBC WITH DIFFERENTIAL WITH PLATELET - Abnormal; Notable for the following components:    Neutrophil Absolute Prelim 8.84 (*)     Neutrophil Absolute 8.84 (*)     Lymphocyte Absolute 0.44 (*)     Monocyte Absolute 0.06 (*)     All other components within normal limits   TROPONIN I HIGH SENSITIVITY - Normal   RAINBOW DRAW BLUE     EKG    Rate, intervals and axes as noted on EKG Report.  Rate: 66  Rhythm: Sinus Rhythm  Reading: Anterior ST elevation with T wave inversions V2 V3 and V4.  These are new changes when looking at an old EKG from February that the patient brought with her.  Second EKG was performed and looks similar at a rate of 71.  Both of these were read by myself.           ED Course as of 03/12/25 1610  ------------------------------------------------------------  Time: 03/12 1526  Comment: Cardiology here, second EKG does not show much change,  pain 6/10.  Holding heparin  ------------------------------------------------------------  Time: 03/12 1531  Comment: Dr Ruiz and Dr Medel here pt to go to cath lab  ------------------------------------------------------------  Time: 03/12 9517  Comment: Labs independently interpreted by me.  Mild hypokalemia.  Mild  transaminitis.  CBC normal, first troponin normal.  Patient is in Cath Lab at this time.              MDM      No results found.          Medical Decision Making  Patient with chest pressure after surgery.  Having some anterior ST elevation with T wave inversions that appear to be new from an old EKG sent with the patient dated from February 2025.    Amount and/or Complexity of Data Reviewed  External Data Reviewed: notes.  Labs: ordered. Decision-making details documented in ED Course.  ECG/medicine tests: ordered and independent interpretation performed. Decision-making details documented in ED Course.        Disposition and Plan     Clinical Impression:  1. ST elevation myocardial infarction (STEMI), unspecified artery (HCC)         Disposition:  Send to or/cath/gi  3/12/2025  4:10 pm    Follow-up:  No follow-up provider specified.        Medications Prescribed:  Current Discharge Medication List              Supplementary Documentation:

## 2025-03-12 NOTE — PROCEDURES
[facility]  Nelly Garcia Patient Status:  Emergency    1975 MRN [id number]   Location [facility] INTERVENTIONAL SUITES Attending No att. providers found   Hosp Day # 0 PCP Shaan Stack MD       Cardiologist: Hugh Ruiz MD  Primary Proceduralist: Hugh Ruiz MD  Procedure Performed: [facility]  Date of Procedure: 3/12/2025   Indication: ST elevation MI    Summary of findings: Mild nonobstructive CAD      Left Ventriculography and hemodynamics: LVEDP 14 mmHg LVEF hyperdynamic 75%.      Coronary Angiography  RCA:  Dominant and free of obstructive disease, supplies PDA and PL  Left main:  Patent, free of obstructive disease  Left anterior descending:  Patent minor luminal irregularities, supplies 2 diagonals which are non-obstructive  Circumflex:  Patent and free of obstructive disease, supplies 2 OM branches which are patent      Assessment:  Minor luminal irregularities      Recommendations:  Medical management      Description of Procedure:   After written informed consent was obtained from the patient, patient was brought to the cardiac catheterization laboratory.  Patient was prepped and draped in the usual sterile fashion. Lidocaine 1% was used to infiltrate the right groin for local anesthesia and a 6 Korean introducer sheath was inserted into the right femoral artery.      Selective coronary angiography performed with JR4 catheter for RCA and JL4 catheter for LCA.  Angiography performed in standard projections.      6 Malagasy pigtail placed in LV for LV angiogram in HERNANDEZ projection and LV hemodynamics.    Selective right femoral angiogram done assess anatomy for closure.      Specimen sent to: No specimen collected  Estimated blood loss: 10 cc  Closure: Mynx       IV was maintained by RN and moderate conscious sedation of versed and fentanyl was given.  Patient was assessed and monitoring of oxygen, heart rate and blood pressure by nurse and  myself during the exam 25 minutes.      Hugh Ruiz MD  03/12/25

## 2025-03-12 NOTE — DISCHARGE INSTRUCTIONS
HOME CARE INSTRUCTIONS FOLLOWING CORONARY ANGIOGRAPHY, ANGIOPLASTY (PTCA/PTA) OR INSERTION OF STENT IN THE CORONARY      Activity  DO NOT drive after the procedure.  You may resume driving late the following day according to the nurse or physician's instructions  Avoid drinking alcohol for the next 24 hours  If the groin site was used, avoid repeated stair use and excessive walking for the next 24 hours  Do not lift anything over 10 pounds for 1 week  Plan on resting and relaxing tonight and tomorrow  Resume your normal activity after 48 hours, or as instructed by your physician    What is Normal?  A small lump at the procedure site associated with mild tenderness when touched  The procedure site may be bruised or discolored  There may be a small amount of drainage on the bandage    Special Instructions  The bandage is to remain in place for 24 hours; Keep the bandage clean and dry; Do NOT shower for 24 hours  After 24 hours, you must remove the bandage  You should shower after removing the bandage, and wash the procedure site gently with soap and water  DO NOT submerge the procedure site for 1 week (no bath tubs or pools)  Drink plenty of fluids during the next 24 hours to \"flush\" the contrast from your system  Do NOT take meformin/glucophage containing medications for 48 hours    When you should NOTIFY YOUR PHYSICIAN  Call right away if you experience any of the following:    Swelling, pain, or bleeding at the site that is not relieved by applying ice or pressure  Signs of infection: redness, warmth, drainage at the site, chills, or temperature of 100.5 degrees or greater.  Changes in sensation, numbness, or tingling of affected leg.    May call answering service at [phone] for any problems or concerns    Bleeding can occur at the procedure site - both on the outside of the skin and/or beneath the surface of the skin  Swelling or a large lump at the procedure site can occur, which may be accompanied by  moderate to severe pain    If either of the above occurs, lie down flat.  Have someone apply pressure to the procedure site with both hands, as instructed by the nurse.  Hold pressure for 20 minutes and the bleeding should stop.  Notify your physician of the occurrence  If the bleeding does not stop, call 911 and continue to apply pressure    If you experience signs of a fever, temperature > 101°, chills, infection (redness, swelling, thick yellow drainage, or a foul odor from the procedure site)  If you notice any numbness, tingling, or loss of feeling to your leg or foot or groin access    Closure device utilized  type of closure used: Mynx  Additional Instructions:  Leave the dressing/band-aid over the site for 24 hours.   You may feel a \"pea or olive pit\" sized lumpand/or note mild tenderness in the groin area for approximately 5-7 days.         The collagen will be resorbed (broken down) by your body in approximately 6-12 weeks.     See appropriate pamphlet information   Other  You may resume your present diet, unless otherwise specified by your physician.  You may resume all of your medications as prescribed, unless otherwise directed by your physician.  A list of your medications was provided to you at discharge.  Continue the walking program initiated in the hospital and progress your walking as directed.  Or, gradually resume your previous aerobic exercise schedule as tolerated.  Please call your physician’s office for a follow-up appointment.  You should be seen in 7-10 days.  Ok to go home  Fu dr ojeda  Return if bleeds  No work till cleared by dr ojeda     Medication List        START taking these medications      acetaminophen 500 MG Tabs  Commonly known as: Tylenol Extra Strength     atorvastatin 20 MG Tabs  Commonly known as: Lipitor  Take 1 tablet (20 mg total) by mouth nightly.     pantoprazole 40 MG Tbec  Commonly known as: Protonix  Take 1 tablet (40 mg total) by mouth daily. Start  tonight            CONTINUE taking these medications      lisinopril 10 MG Tabs  Commonly known as: Zestril     metFORMIN 500 MG Tabs  Commonly known as: Glucophage            STOP taking these medications      Arnuity Ellipta 100 MCG/ACT Aepb  Generic drug: fluticasone furoate     Omega 3 1000 MG Caps     phenazopyridine 200 MG Tabs  Commonly known as: Pyridum     vitamin E 400 UNITS Caps  Commonly known as: Alpha-E               Where to Get Your Medications        These medications were sent to [facility] - [place], [place] - [address] [place] [address] [address] [phone], [phone]  [address] [facility] [address], [facility] [address]      Phone: [phone]   atorvastatin 20 MG Tabs  pantoprazole 40 MG Tbec

## 2025-03-13 VITALS
RESPIRATION RATE: 20 BRPM | WEIGHT: 171.81 LBS | BODY MASS INDEX: 34 KG/M2 | TEMPERATURE: 98 F | SYSTOLIC BLOOD PRESSURE: 144 MMHG | DIASTOLIC BLOOD PRESSURE: 84 MMHG | OXYGEN SATURATION: 96 % | HEART RATE: 71 BPM

## 2025-03-13 LAB
ANION GAP SERPL CALC-SCNC: 10 MMOL/L (ref 0–18)
BASOPHILS # BLD AUTO: 0.06 X10(3) UL (ref 0–0.2)
BASOPHILS NFR BLD AUTO: 0.5 %
BUN BLD-MCNC: 13 MG/DL (ref 9–23)
BUN/CREAT SERPL: 16.3 (ref 10–20)
CALCIUM BLD-MCNC: 8.7 MG/DL (ref 8.7–10.4)
CHLORIDE SERPL-SCNC: 106 MMOL/L (ref 98–112)
CHOLEST SERPL-MCNC: 225 MG/DL (ref ?–200)
CO2 SERPL-SCNC: 24 MMOL/L (ref 21–32)
CREAT BLD-MCNC: 0.8 MG/DL
DEPRECATED RDW RBC AUTO: 40.8 FL (ref 35.1–46.3)
EGFRCR SERPLBLD CKD-EPI 2021: 90 ML/MIN/1.73M2 (ref 60–?)
EOSINOPHIL # BLD AUTO: 0.08 X10(3) UL (ref 0–0.7)
EOSINOPHIL NFR BLD AUTO: 0.7 %
ERYTHROCYTE [DISTWIDTH] IN BLOOD BY AUTOMATED COUNT: 12.8 % (ref 11–15)
GLUCOSE BLD-MCNC: 126 MG/DL (ref 70–99)
HCT VFR BLD AUTO: 37.9 %
HDLC SERPL-MCNC: 47 MG/DL (ref 40–59)
HGB BLD-MCNC: 13.5 G/DL
IMM GRANULOCYTES # BLD AUTO: 0.04 X10(3) UL (ref 0–1)
IMM GRANULOCYTES NFR BLD: 0.3 %
LDLC SERPL CALC-MCNC: 145 MG/DL (ref ?–100)
LYMPHOCYTES # BLD AUTO: 1.5 X10(3) UL (ref 1–4)
LYMPHOCYTES NFR BLD AUTO: 12.5 %
MCH RBC QN AUTO: 31.2 PG (ref 26–34)
MCHC RBC AUTO-ENTMCNC: 35.6 G/DL (ref 31–37)
MCV RBC AUTO: 87.5 FL
MONOCYTES # BLD AUTO: 0.68 X10(3) UL (ref 0.1–1)
MONOCYTES NFR BLD AUTO: 5.7 %
NEUTROPHILS # BLD AUTO: 9.67 X10 (3) UL (ref 1.5–7.7)
NEUTROPHILS # BLD AUTO: 9.67 X10(3) UL (ref 1.5–7.7)
NEUTROPHILS NFR BLD AUTO: 80.3 %
NONHDLC SERPL-MCNC: 178 MG/DL (ref ?–130)
OSMOLALITY SERPL CALC.SUM OF ELEC: 292 MOSM/KG (ref 275–295)
PLATELET # BLD AUTO: 197 10(3)UL (ref 150–450)
POTASSIUM SERPL-SCNC: 4.1 MMOL/L (ref 3.5–5.1)
POTASSIUM SERPL-SCNC: 4.1 MMOL/L (ref 3.5–5.1)
RBC # BLD AUTO: 4.33 X10(6)UL
SODIUM SERPL-SCNC: 140 MMOL/L (ref 136–145)
TRIGL SERPL-MCNC: 182 MG/DL (ref 30–149)
VLDLC SERPL CALC-MCNC: 34 MG/DL (ref 0–30)
WBC # BLD AUTO: 12 X10(3) UL (ref 4–11)

## 2025-03-13 PROCEDURE — 99239 HOSP IP/OBS DSCHRG MGMT >30: CPT | Performed by: HOSPITALIST

## 2025-03-13 RX ORDER — PANTOPRAZOLE SODIUM 40 MG/1
40 TABLET, DELAYED RELEASE ORAL DAILY
Qty: 30 TABLET | Refills: 0 | Status: SHIPPED | OUTPATIENT
Start: 2025-03-13

## 2025-03-13 RX ORDER — ATORVASTATIN CALCIUM 20 MG/1
20 TABLET, FILM COATED ORAL NIGHTLY
Qty: 30 TABLET | Refills: 1 | Status: SHIPPED | OUTPATIENT
Start: 2025-03-13

## 2025-03-13 RX ORDER — ACETAMINOPHEN 500 MG
500 TABLET ORAL EVERY 6 HOURS PRN
Status: SHIPPED | COMMUNITY
Start: 2025-03-13

## 2025-03-13 RX ORDER — LISINOPRIL 10 MG/1
10 TABLET ORAL DAILY
Status: DISCONTINUED | OUTPATIENT
Start: 2025-03-13 | End: 2025-03-13

## 2025-03-13 NOTE — CONSULTS
[facility] - CARDIOLOGY CONSULT NOTE    Nelly Garcia Patient Status:  Inpatient    1975 MRN [id number]   Location [facility] 3W/SW Attending Steven Freed,*   Hosp Day # 1 PCP Shaan Stack MD     Date of Admission:  3/12/2025  Date of Consult:  3/13/2025  I was asked by Steven Freed,* to provide recommendations for evaluation and management of cardiac issues.  Impression:     50-year-old female with ST elevations and chest pain.    Recommendations:  1.  ST elevation MI Wellens criteria on EKG concern for proximal LAD disease.  Proceed with left heart catheterization.  Aspirin 325 mg given.  Given recent surgery today we will hold off giving IV heparin for now  2.  Hypertension  On lisinopril 10 mg daily  3.  Obesity   4.  Prediabetes  5.  Recent laparoscopic surgery for cholecystectomy      L5 consult.    Reason for Consultation:     Abnormal EKG, ST elevation MI.    History of Present Illness:   Patient is a 50 year old female who was admitted to the hospital for chest pain.  Patient underwent laparoscopic cholecystectomy at outpatient surgical center earlier today.  Post surgery started to develop chest pain and was recommended to go to the hospital.  At that point her  took her in their private car and brought her to [facility].  She continued to have chest pain center of the chest 7 out of 10.  EKG showed anterior T wave inversions and intraventricular conduction delay cardiology was consulted to evaluate for possible ST elevation MI.  Still continues to have chest pain.  Pain started after she woke up from anesthesia.      Cardiac tests:    Past Medical History  Past Medical History:    Hypertension       Past Surgical History  Past Surgical History:   Procedure Laterality Date    Colonoscopy  19= Diverticulosis, Hemorrhoids    Repeat     Other surgical history  2019    Cystoscopy Dr. Kee     Upper gi endoscopy performed   1/29/19= Normal    Upper gi endoscopy,biopsy  10/25/17=Gastritis. H pylori negative       Family History  No family history on file.    Social History  Pediatric History   Patient Parents    Not on file     Other Topics Concern    Not on file   Social History Narrative    Not on file         Denies smoking or drug use denies alcohol use.    Current Medications:  Current Facility-Administered Medications   Medication Dose Route Frequency    acetaminophen (Tylenol Extra Strength) tab 500 mg  500 mg Oral Q4H PRN    temazepam (Restoril) cap 15 mg  15 mg Oral Nightly PRN    ondansetron (Zofran) 4 MG/2ML injection 4 mg  4 mg Intravenous Q6H PRN    prochlorperazine (Compazine) 10 MG/2ML injection 5 mg  5 mg Intravenous Q8H PRN     Medications Prior to Admission   Medication Sig    metFORMIN 500 MG Oral Tab Take 1 tablet (500 mg total) by mouth 2 (two) times daily with meals.    lisinopril 10 MG Oral Tab Take 1 tablet (10 mg total) by mouth daily.    ARNUITY ELLIPTA 100 MCG/ACT Inhalation Aerosol Powder, Breath Activated INHALE 100 MCG INTO THE LUNGS QD (Patient not taking: Reported on 1/19/2022)    vitamin E 400 UNITS Oral Cap daily. (Patient not taking: Reported on 11/18/2024)    Omega 3 1000 MG Oral Cap Take 2 capsules by mouth daily. (Patient not taking: Reported on 11/18/2024)    Phenazopyridine HCl 200 MG Oral Tab Take 1 tablet (200 mg total) by mouth 3 (three) times daily as needed for Pain. (Patient not taking: Reported on 1/19/2022)       Allergies  Allergies[1]    Review of Systems:   10 pt ROS performed, separate from HPI  Review of Systems:  GENERAL: no fevers, chills, sweats  HEENT: no visual or hearing changes  SKIN: denies any unusual skin lesions or rashes  RESPIRATORY: denies shortness of breath with exertion  CARDIOVASCULAR: no active chest pain, no claudication  GI: denies abdominal pain and denies heartburn  : no dysuria or hematuria  NEURO: denies headaches, focal weaknesses or paresthesias  All other  systems reviewed and negative.  fatigue is present  All other systems were reviewed are negative  Physical Exam:   Blood pressure 135/79, pulse 60, temperature 98.3 °F (36.8 °C), temperature source Oral, resp. rate 20, weight 171 lb 12.8 oz (77.9 kg), SpO2 96%.    Scheduled Meds:       Physical Exam:    General: Alert and oriented.  Overweight female in mild distress no respiratory or constitutional distress.  HEENT: Normocephalic, anicteric sclera, neck supple  Neck: No JVD, carotids 2+, supple  Cardiac: Regular rate. No pathologic murmur.  Lungs: Clear with normal effort.  Normal excursions and effort.  Abdomen: Soft, non-tender. BS-present.  Extremities: Without clubbing, cyanosis.  Peripheral pulsespresent.  Neurologic: Alert and oriented, normal affect. Motor ok.  Skin: Warm and dry.     Results:     Laboratory Data:  Lab Results   Component Value Date    WBC 12.0 (H) 03/13/2025    HGB 13.5 03/13/2025    HCT 37.9 03/13/2025    .0 03/13/2025    CREATSERUM 0.80 03/13/2025    BUN 13 03/13/2025     03/13/2025    K 4.1 03/13/2025    K 4.1 03/13/2025     03/13/2025    CO2 24.0 03/13/2025     (H) 03/13/2025    CA 8.7 03/13/2025    ALB 3.8 03/12/2025    ALKPHO 92 03/12/2025    TP 6.7 03/12/2025     (H) 03/12/2025     (H) 03/12/2025    PTT 31.9 11/18/2024    PTT 31.6 11/18/2024    INR 0.94 11/18/2024    PTP 13.1 11/18/2024    PTP 12.7 11/18/2024    B12 534 01/22/2022         Thank you for allowing me to participate in the care of your patient.    Hugh Ruiz MD  [facility]  Interventional Cardiology  3/12/2025         [1] No Known Allergies

## 2025-03-13 NOTE — PROGRESS NOTES
[facility] Cardiology Progress Note    Nelly Garcia Patient Status:  Inpatient    1975 MRN [id number]   Location [facility] 3W/SW Attending Jad Medel MD   Hosp Day # 1 PCP Sahan Stack MD     Subjective:  No CV concerns - no recurrent cp since yesterday   Mild incisional pain     Objective:  /79 (BP Location: Right arm)   Pulse 60   Temp 98.3 °F (36.8 °C) (Oral)   Resp 20   Wt 171 lb 12.8 oz (77.9 kg)   SpO2 96%   BMI 33.55 kg/m²     Telemetry: NSR, 60 bpm       Intake/Output:    Intake/Output Summary (Last 24 hours) at 3/13/2025 0843  Last data filed at 3/13/2025 0841  Gross per 24 hour   Intake 620 ml   Output --   Net 620 ml       Last 3 Weights   25 0500 171 lb 12.8 oz (77.9 kg)   25 1832 175 lb 3.2 oz (79.5 kg)   25 1516 172 lb 6.4 oz (78.2 kg)   24 1508 172 lb 4.8 oz (78.2 kg)   22 1339 172 lb (78 kg)       Labs:  Recent Labs   Lab 25  1519 25  0707   * 126*   BUN 9 13   CREATSERUM 0.83 0.80   EGFRCR 86 90   CA 8.4* 8.7    140   K 3.4* 4.1  4.1    106   CO2 25.0 24.0     Recent Labs   Lab 25  1519 25  0707   RBC 4.34 4.33   HGB 13.3 13.5   HCT 38.5 37.9   MCV 88.7 87.5   MCH 30.6 31.2   MCHC 34.5 35.6   RDW 12.7 12.8   NEPRELIM 8.84* 9.67*   WBC 9.4 12.0*   .0 197.0         Recent Labs   Lab 25  1519   TROPHS <3       Diagnostics:     3/12/25 [facility]   Cardiologist: Hugh Ruiz MD  Primary Proceduralist: Hugh Ruiz MD  Procedure Performed: LHC  Date of Procedure: 3/12/2025   Indication: ST elevation MI     Summary of findings: Mild nonobstructive CAD     Left Ventriculography and hemodynamics: LVEDP 14 mmHg LVEF hyperdynamic 75%.     Coronary Angiography  RCA:  Dominant and free of obstructive disease, supplies PDA and PL  Left main:  Patent, free of obstructive disease  Left anterior descending:  Patent minor luminal irregularities, supplies 2 diagonals which are  non-obstructive  Circumflex:  Patent and free of obstructive disease, supplies 2 OM branches which are patent     Assessment:  Minor luminal irregularities     Recommendations:  Medical management      Review of Systems   Respiratory: Negative.     Cardiovascular: Negative.      Physical Exam:    General: Alert and oriented x 3. No apparent distress.   HEENT: Normocephalic, anicteric sclera, neck supple, no thyromegaly or adenopathy.  Neck: No JVD, carotids 2+, no bruits.  Cardiac: Regular rate & rhythm. S1, S2 normal. No murmur, pericardial rub, S3, or extra cardiac sounds.  Lungs: Clear without wheezes, rales, rhonchi or dullness.  Normal excursions and effort.  Abdomen: Soft, non-tender. No organosplenomegally, mass or rebound, BS-present.  Extremities: Without clubbing or cyanosis.  No left lower extremity edema, no right lower extremity edema.  Neurologic: Alert and oriented, normal affect. No focal defects  Skin: Warm and dry.   Right groin - C/D/I. Soft. Non-tender. 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 No echo required at this time  No recurrent anginal symptoms . Hemodynamically stable   Blood pressure stable. Resume home lisinopril 10mg daily   . Start atorvastatin 20mg po daily. Repeat LFTs in 2 months  Ok to discharge from Cardiology standpoint. F/u with Dr. Ruiz in 1-2 weeks     Case/plan d/w Dr. Sara Puckett, MSN, FPA-APRN, FNP-BC, CCK   3/13/2025  1:34 PM  Ph [phone] ([place])  Ph [phone] ([place])        L3  Seen and examined bedside. Agree with the present management, will sign off.  Nonobstructive disease on  cardiac cath EF 75%.  Recommend atorvastatin 20 mg daily for primary prevention  Thank you for allowing me to participate in the care of your patient, feel free to contact me if you have any questions.    Hugh Ruiz MD  [facility]  Interventional Cardiology.

## 2025-03-13 NOTE — PLAN OF CARE
Report given to DAMON Jacob.     Problem: Patient Centered Care  Goal: Patient preferences are identified and integrated in the patient's plan of care  Description: Interventions:- What would you like us to know as we care for you? I live at home with my family- Provide timely, complete, and accurate information to patient/family- Incorporate patient and family knowledge, values, beliefs, and cultural backgrounds into the planning and delivery of care- Encourage patient/family to participate in care and decision-making at the level they choose- Honor patient and family perspectives and choices  3/13/2025 1455 by Alvin Landeros RN  Outcome: Progressing  3/13/2025 1313 by Alvin Landeros RN  Outcome: Progressing      Problem: CARDIOVASCULAR - ADULT  Goal: Maintains optimal cardiac output and hemodynamic stability  Description: INTERVENTIONS:- Monitor vital signs, rhythm, and trends- Monitor for bleeding, hypotension and signs of decreased cardiac output- Evaluate effectiveness of vasoactive medications to optimize hemodynamic stability- Monitor arterial and/or venous puncture sites for bleeding and/or hematoma- Assess quality of pulses, skin color and temperature- Assess for signs of decreased coronary artery perfusion - ex. Angina- Evaluate fluid balance, assess for edema, trend weights  3/13/2025 1455 by Alvin Landeros RN  Outcome: Progressing  3/13/2025 1313 by Alvin Landeros RN  Outcome: Progressing  Goal: Absence of cardiac arrhythmias or at baseline  Description: INTERVENTIONS:- Continuous cardiac monitoring, monitor vital signs, obtain 12 lead EKG if indicated- Evaluate effectiveness of antiarrhythmic and heart rate control medications as ordered- Initiate emergency measures for life threatening arrhythmias- Monitor electrolytes and administer replacement therapy as ordered  3/13/2025 1455 by Alvin Landeros RN  Outcome: Progressing  3/13/2025 1313 by Alvin Landeros RN  Outcome: Progressing

## 2025-03-13 NOTE — DISCHARGE SUMMARY
Dc summary#[id number]  > 30 min spent on dc    Hospital Discharge Diagnoses: non card chest pain    Lace+ Score: 53  59-90 High Risk  29-58 Medium Risk  0-28   Low Risk.    TCM Follow-Up Recommendation:  LACE < 29: Low Risk of readmission after discharge from the hospital; Still recommend for TCM follow-up.

## 2025-03-13 NOTE — PLAN OF CARE
Problem: Patient Centered Care  Goal: Patient preferences are identified and integrated in the patient's plan of care  Description: Interventions:- What would you like us to know as we care for you? - Provide timely, complete, and accurate information to patient/family- Incorporate patient and family knowledge, values, beliefs, and cultural backgrounds into the planning and delivery of care- Encourage patient/family to participate in care and decision-making at the level they choose- Honor patient and family perspectives and choices  Outcome: Progressing     Problem: Patient/Family Goals  Goal: Patient/Family Long Term Goal  Description: Patient's Long Term Goal: Interventions:- - See additional Care Plan goals for specific interventions  Outcome: Progressing  Goal: Patient/Family Short Term Goal  Description: Patient's Short Term Goal: Interventions: - - See additional Care Plan goals for specific interventions  Outcome: Progressing     Problem: CARDIOVASCULAR - ADULT  Goal: Maintains optimal cardiac output and hemodynamic stability  Description: INTERVENTIONS:- Monitor vital signs, rhythm, and trends- Monitor for bleeding, hypotension and signs of decreased cardiac output- Evaluate effectiveness of vasoactive medications to optimize hemodynamic stability- Monitor arterial and/or venous puncture sites for bleeding and/or hematoma- Assess quality of pulses, skin color and temperature- Assess for signs of decreased coronary artery perfusion - ex. Angina- Evaluate fluid balance, assess for edema, trend weights  Outcome: Progressing  Goal: Absence of cardiac arrhythmias or at baseline  Description: INTERVENTIONS:- Continuous cardiac monitoring, monitor vital signs, obtain 12 lead EKG if indicated- Evaluate effectiveness of antiarrhythmic and heart rate control medications as ordered- Initiate emergency measures for life threatening arrhythmias- Monitor electrolytes and administer replacement therapy as  ordered  Outcome: Progressing

## 2025-03-13 NOTE — PAYOR COMM NOTE
--------------  ADMISSION REVIEW     Payor: JUDD SHERMAN  Subscriber #:  [id number]  Authorization Number: [id number]    Admit date: 3/12/25  Admit time:  6:22 PM     Patient Seen in: [facility] Emergency Department    History     Chief Complaint   Patient presents with    Cht Pain   Stated Complaint: chest pain with ekg changes from dr    51 y/o female who is prediabetic with hypertension here with chest discomfort and EKG changes after laparoscopic cholecystectomy.  Daughters in the room helping translate Urdu.  She had surgery around 11 AM.  According to the daughter they noticed some EKG changes towards the end of surgery when she woke up from anesthesia she was complaining of chest pressure.  It is mild right now.  She does have some nausea.  No diaphoresis.  No cough or congestion.  She was fine going into the surgery.  Denies tobacco or family history of heart disease.  Patient's surgery was in [place],  drove her here.      Physical Exam     ED Triage Vitals [03/12/25 1516]   /74   Pulse 69   Resp 16   Temp 98 °F (36.7 °C)   Temp src Oral   SpO2 93 %   O2 Device None (Room air)       Current Vitals:   Vital Signs  BP: 122/69  Pulse: 92  Resp: 16  Temp: 98 °F (36.7 °C)  Temp src: Oral  MAP (mmHg): 86    Oxygen Therapy  SpO2: 93 %  O2 Device: None (Room air)      Physical Exam  HENT:      Head: Normocephalic.      Mouth/Throat:      Mouth: Mucous membranes are moist.      Pharynx: Oropharynx is clear.   Eyes:      Extraocular Movements: Extraocular movements intact.      Pupils: Pupils are equal, round, and reactive to light.   Cardiovascular:      Rate and Rhythm: Normal rate and regular rhythm.      Heart sounds: Normal heart sounds.   Pulmonary:      Effort: Pulmonary effort is normal.      Breath sounds: Normal breath sounds.   Abdominal:      Palpations: Abdomen is soft.      Tenderness: There is no abdominal tenderness.      Comments: Lap symone dressings in place   Musculoskeletal:          General: No swelling or tenderness. Normal range of motion.      Cervical back: Normal range of motion.   Lymphadenopathy:      Cervical: No cervical adenopathy.   Skin:     General: Skin is warm.      Capillary Refill: Capillary refill takes less than 2 seconds.   Neurological:      General: No focal deficit present.      Mental Status: She is alert.     Labs Reviewed   COMP METABOLIC PANEL (14) - Abnormal; Notable for the following components:       Result Value    Glucose 140 (*)     Potassium 3.4 (*)     Calcium, Total 8.4 (*)      (*)      (*)     All other components within normal limits   CBC WITH DIFFERENTIAL WITH PLATELET - Abnormal; Notable for the following components:    Neutrophil Absolute Prelim 8.84 (*)     Neutrophil Absolute 8.84 (*)     Lymphocyte Absolute 0.44 (*)     Monocyte Absolute 0.06 (*)     All other components within normal limits   TROPONIN I HIGH SENSITIVITY - Normal   RAINBOW DRAW BLUE     EKG 66  Sinus Rhythm  Reading: Anterior ST elevation with T wave inversions V2 V3 and V4.  These are new changes when looking at an old EKG from February that the patient brought with her.  Second EKG was performed and looks similar at a rate of 71.  Both of these were read by myself.    Time: 03/12 1526  Comment: Cardiology here, second EKG does not show much change,  pain 6/10.  Holding heparin  ------------------------------------------------------------  Time: 03/12 1531  Comment: Dr Ruiz and Dr Medel here pt to go to cath lab  ------------------------------------------------------------  Time: 03/12 1609  Comment: Labs independently interpreted by me.  Mild hypokalemia.  Mild transaminitis.  CBC normal, first troponin normal.  Patient is in Cath Lab at this time.       Medical Decision Making  Patient with chest pressure after surgery.  Having some anterior ST elevation with T wave inversions that appear to be new from an old EKG sent with the patient dated from  February 2025.  Disposition and Plan     Clinical Impression:  1. ST elevation myocardial infarction (STEMI), unspecified artery (HCC)           History and Physical       HISTORY AND PHYSICAL EXAMINATION     CHIEF COMPLAINT:  Possible ST elevation myocardial infarction.     HISTORY OF PRESENT ILLNESS:  The patient is a 50-year-old  female who had elective laparoscopic cholecystectomy earlier today at an outpatient surgical center.  When she woke up from anesthesia started complaining of midsternal chest discomfort.  EKG showed anterior ST elevation and T wave inversion.  Sent to the emergency department for evaluation.  Arrived via cardiac alert and was taken to the catheterization lab by Cardiology.     PAST MEDICAL HISTORY:  Prediabetic state, obesity, hyperlipidemia, and hypertension.     PAST SURGICAL HISTORY:  Cystoscopy, diagnostic laparoscopy.  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  Pathology is benign.  Also today laparoscopic cholecystectomy.     MEDICATIONS:  Please see medication reconciliation list.      ALLERGIES:  No known drug allergies.     FAMILY HISTORY:  Positive for diabetes mellitus type 2 and hypertension.     SOCIAL HISTORY:  No tobacco, alcohol, or drug use.     REVIEW OF SYSTEMS:  The patient described midsternal chest pressure when she woke up from anesthesia.  No radiation.  She denies any prior history of similar pain.  Other 12-point review of systems negative.       PHYSICAL EXAMINATION:    GENERAL:  Alert, oriented to time, place, and person, mild distress.   VITAL SIGNS:  Temperature 98.0.  Pulse 92.  Respiratory rate 16.  Blood pressure 122/69.  Pulse ox 93% on room air.  HEENT:  Atraumatic.  Oropharynx clear.  Moist mucous membranes.  Ears, nose normal.  Eyes:  Anicteric sclerae.  NECK:  Supple.  No lymphadenopathy.  Trachea midline.  Full range of motion.  LUNGS:  Clear to  auscultation bilaterally.  Normal respiratory effort.  HEART:  Regular rate and rhythm.  S1 and S2 auscultated.  No murmur.  ABDOMEN:  Soft, nondistended.  Laparoscopic incisions.  EXTREMITIES:  No peripheral edema, clubbing, or cyanosis.  NEUROLOGIC:  Motor and sensory intact.     ASSESSMENT:    1.       Possible non-ST elevation myocardial infarction.  2.       Essential hypertension.  3.       Obesity.  4.       Prediabetic state.     PLAN:  The patient will be admitted to telemetry floor.  Cardiac angiogram per Cardiology.  Further recommendations pending cardiac angiogram results.       CARDS:    S/AMG Cardiac Cath Procedure Note        Nelly Garcia Patient Status:  Emergency    1975 MRN [id number]   Location [facility] INTERVENTIONAL SUITES Attending No att. providers found   Hosp Day # 0 PCP Shaan Stack MD         Cardiologist: Hugh Ruiz MD  Primary Proceduralist: Hugh Ruiz MD  Procedure Performed: [facility]  Date of Procedure: 3/12/2025   Indication: ST elevation MI     Summary of findings: Mild nonobstructive CAD        Left Ventriculography and hemodynamics: LVEDP 14 mmHg LVEF hyperdynamic 75%.        Coronary Angiography  RCA:  Dominant and free of obstructive disease, supplies PDA and PL  Left main:  Patent, free of obstructive disease  Left anterior descending:  Patent minor luminal irregularities, supplies 2 diagonals which are non-obstructive  Circumflex:  Patent and free of obstructive disease, supplies 2 OM branches which are patent        Assessment:  Minor luminal irregularities        Recommendations:  Medical management        Description of Procedure:   After written informed consent was obtained from the patient, patient was brought to the cardiac catheterization laboratory.  Patient was prepped and draped in the usual sterile fashion. Lidocaine 1% was used to infiltrate the right groin for local anesthesia and a 6 Japanese introducer sheath was inserted into the  right femoral artery.       Selective coronary angiography performed with JR4 catheter for RCA and JL4 catheter for LCA.  Angiography performed in standard projections.       6 Latvian pigtail placed in LV for LV angiogram in HERNANDEZ projection and LV hemodynamics.     Selective right femoral angiogram done assess anatomy for closure.        Specimen sent to: No specimen collected  Estimated blood loss: 10 cc  Closure: Mynx      MEDICATIONS ADMINISTERED IN LAST 1 DAY:  aspirin chewable tab 324 mg       Date Action Dose Route User    3/12/2025 1524 Given 324 mg Oral Pinky Haynes RN          aspirin 81 MG chewable tab       Date Action Dose Route User    3/12/2025 1524 Given 324 mg Oral Pinky Haynes RN          nitroglycerin (Nitrostat) SL tab 0.4 mg       Date Action Dose Route User    3/12/2025 1528 Given 0.4 mg Sublingual Pinky Haynes RN          potassium chloride (Klor-Con M20) tab 40 mEq       Date Action Dose Route User    3/12/2025 1946 Given 40 mEq Oral Delfina, Kayla          sodium chloride 0.9% infusion 1,000 mL       Date Action Dose Route User    3/12/2025 1530 New Bag 1,000 mL Intravenous Pinky Haynes RN            Vitals (last day)       Date/Time Temp Pulse Resp BP SpO2 Weight O2 Device O2 Flow Rate (L/min) Who    03/13/25 0837 98.3 °F (36.8 °C) 60 20 135/79 96 % -- None (Room air) -- RS    03/13/25 0627 98.2 °F (36.8 °C) 67 18 122/67 95 % -- None (Room air) -- CC    03/13/25 0500 -- -- -- -- -- 171 lb 12.8 oz (77.9 kg) -- -- AA    03/13/25 0300 -- 72 -- -- -- -- -- -- EL    03/12/25 2030 98.6 °F (37 °C) 79 18 141/68 96 % -- None (Room air) -- EC    03/12/25 1930 -- 73 18 137/71 95 % -- None (Room air) -- EC    03/12/25 1900 -- 83 -- -- -- -- -- -- EL    03/12/25 1832 -- -- -- -- -- 175 lb 3.2 oz (79.5 kg) -- -- SD    03/12/25 1829 -- 66 18 134/72 96 % -- None (Room air) -- SD    03/12/25 1818 -- 70 -- -- -- -- -- --     03/12/25 1815 -- 69 17 123/70 95 % -- None (Room air) -- MG     03/12/25 1800 -- 69 17 123/70 95 % -- None (Room air) -- MG    03/12/25 1730 -- 71 16 126/70 95 % -- None (Room air) -- MG    03/12/25 1715 -- 72 16 118/69 95 % -- None (Room air) -- MG    03/12/25 1700 -- 79 15 117/67 94 % -- None (Room air) -- MG    03/12/25 1645 -- 78 10 125/72 95 % -- None (Room air) -- MG    03/12/25 1630 -- 79 18 118/73 95 % -- None (Room air) -- MG    03/12/25 1615 -- 86 14 120/67 94 % -- None (Room air) -- MG    03/12/25 1535 -- 92 16 122/69 93 % -- None (Room air) -- CP    03/12/25 15:32:06 -- 89 18 125/73 95 % -- None (Room air) -- CP    03/12/25 1516 98 °F (36.7 °C) 69 16 117/74 93 % 172 lb 6.4 oz (78.2 kg) None (Room air) -- CP

## 2025-03-13 NOTE — PLAN OF CARE
Pt. Nelly is A&Ox 4. Lives at home with spouse. Patient is ambulating using SBA. Patient isn't experiencing pain. Continent of bowel and bladder.     Plan is *** .    Problem: Patient Centered Care  Goal: Patient preferences are identified and integrated in the patient's plan of care  Description: Interventions:- What would you like us to know as we care for you? I live at home with my spouse- Provide timely, complete, and accurate information to patient/family- Incorporate patient and family knowledge, values, beliefs, and cultural backgrounds into the planning and delivery of care- Encourage patient/family to participate in care and decision-making at the level they choose- Honor patient and family perspectives and choices  Outcome: Progressing    Problem: CARDIOVASCULAR - ADULT  Goal: Maintains optimal cardiac output and hemodynamic stability  Description: INTERVENTIONS:- Monitor vital signs, rhythm, and trends- Monitor for bleeding, hypotension and signs of decreased cardiac output- Evaluate effectiveness of vasoactive medications to optimize hemodynamic stability- Monitor arterial and/or venous puncture sites for bleeding and/or hematoma- Assess quality of pulses, skin color and temperature- Assess for signs of decreased coronary artery perfusion - ex. Angina- Evaluate fluid balance, assess for edema, trend weights  Outcome: Progressing    Goal: Absence of cardiac arrhythmias or at baseline  Description: INTERVENTIONS:- Continuous cardiac monitoring, monitor vital signs, obtain 12 lead EKG if indicated- Evaluate effectiveness of antiarrhythmic and heart rate control medications as ordered- Initiate emergency measures for life threatening arrhythmias- Monitor electrolytes and administer replacement therapy as ordered  Outcome: Progressing

## 2025-03-13 NOTE — PLAN OF CARE
Patient medically cleared to discharge by team. IV and tele monitor removed. Discharge instructions reviewed with patient at bedside.     Problem: Patient Centered Care  Goal: Patient preferences are identified and integrated in the patient's plan of care  Description: Interventions:- What would you like us to know as we care for you? From home with family  - Provide timely, complete, and accurate information to patient/family- Incorporate patient and family knowledge, values, beliefs, and cultural backgrounds into the planning and delivery of care- Encourage patient/family to participate in care and decision-making at the level they choose- Honor patient and family perspectives and choices  Outcome: Completed     Problem: CARDIOVASCULAR - ADULT  Goal: Maintains optimal cardiac output and hemodynamic stability  Description: INTERVENTIONS:- Monitor vital signs, rhythm, and trends- Monitor for bleeding, hypotension and signs of decreased cardiac output- Evaluate effectiveness of vasoactive medications to optimize hemodynamic stability- Monitor arterial and/or venous puncture sites for bleeding and/or hematoma- Assess quality of pulses, skin color and temperature- Assess for signs of decreased coronary artery perfusion - ex. Angina- Evaluate fluid balance, assess for edema, trend weights  Outcome: Completed  Goal: Absence of cardiac arrhythmias or at baseline  Description: INTERVENTIONS:- Continuous cardiac monitoring, monitor vital signs, obtain 12 lead EKG if indicated- Evaluate effectiveness of antiarrhythmic and heart rate control medications as ordered- Initiate emergency measures for life threatening arrhythmias- Monitor electrolytes and administer replacement therapy as ordered  Outcome: Completed

## 2025-03-14 NOTE — DISCHARGE SUMMARY
[facility]    PATIENT'S NAME: YANETH ZULETA   ATTENDING PHYSICIAN: Steven Freed MD   PATIENT ACCOUNT#:   [id number]    LOCATION:  [address] [place] [address]  MEDICAL RECORD #:   [id number]       YOB: 1975  ADMISSION DATE:       03/12/2025      DISCHARGE DATE:  03/13/2025    DISCHARGE SUMMARY      Greater than 30 minutes were spent preparing this discharge.    DISCHARGE DIAGNOSIS:  Noncardiac chest pain, perhaps ulcer related.    HISTORY AND HOSPITAL COURSE:  This is a very pleasant 50-year-old English-speaking  American female who presents status post possible non-ST-elevation myocardial infarction.  The patient was admitted to the hospital, and Dr. Ruiz an angiogram on the patient which showed a dominant right coronary artery free of disease, patent left main.  No significant left anterior descending or circumflex disease.  I discussed the patient's chest discomfort that seemed to be located in her epigastrium, which I pressed and it was very painful.  She denied any kind of bleeding.  In my limited French and her better English, we discussed possibilities of GI issues.  I started her Protonix and after she was cleared by Cardiology, I discharged her home.    PHYSICAL EXAMINATION ON DISCHARGE:    VITAL SIGNS:  Temperature is 98.3, pulse 71, respiratory rate is 20, 144/84, 96%.  LUNGS:  Occasional rhonchi.  HEART:  Normal S1, S2.  No S3.   ABDOMEN:  Soft.  Tender in the epigastric area.   EXTREMITIES:  Without calf tenderness.  NEUROLOGIC:  She is alert, oriented, friendly, and cooperative.    LABORATORY STUDIES:  Please see chart.    ASSESSMENT AND PLAN:    1.   Noncardiac chest discomfort, perhaps related to her abdomen.  Would follow up workup as an outpatient.  Will start Protonix.  Patient told to return if her pain is worse, if she cannot eat, or if she starts to vomit blood.  2.   Hypertension.  3.   Obesity, BMI 33.55.  Needs XANDER workup.  4.   Elevated blood sugar.  Follow  up with primary.    CONDITION ON DISCHARGE:  Stable.    CODE STATUS:  Not discussed during this hospitalization.    ACTIVITIES:  As tolerated.  No work until cleared by Dr. Stack, note given.    DIET:  Low fat, low salt, 2000-calorie ADA.    FOLLOWUP:  Follow up with Dr. Ruiz in 2 weeks.  Follow up with Dr. Stack in 3 days.  Please call for followup advice.     DISCHARGE MEDICATIONS:    1.   Metformin 500 mg twice a day.  2.   Lisinopril 10 mg daily.  3.   Tylenol 500 mg every 6 hours as needed.  Watch total daily Tylenol, limit to 3 g.  4.   Lipitor 20 mg nightly.  5.   Protonix 40 mg daily.    RISK OF READMISSION:  Low.  TCM followup is still recommended as she is outside our system.    Dictated By Steven Freed MD  d: 03/13/2025 17:39:06  t: 03/14/2025 00:12:48  Job [id number]  [facility]/

## 2025-03-26 NOTE — PROGRESS NOTES
Physician Clarification    Additional information related to the patient's condition    Poss gastritis    This note is part of the patient's medical record.

## 2025-03-26 NOTE — PROGRESS NOTES
Physician Clarification    Additional information related to the patient's condition    Stemi and non stemi ruled out    This note is part of the patient's medical record.

## (undated) DIAGNOSIS — E28.1 HYPERANDROGENEMIA SYNDROME, FEMALE, POST PUBERTAL: Primary | ICD-10-CM

## (undated) NOTE — Clinical Note
Date & Time: 3/13/2025, 3:51 PM  Patient: Nelly Garcia  Encounter Provider(s):    Jeremias Gray MD [place], Feras, MD Smulkstys, Steven Martinez MD       To Whom It May Concern:    Nelly Garcia was seen and treated in our department on 3/12/2025. She {Return to school/sport/work:[id number]}.    If you have any questions or concerns, please do not hesitate to call.        _____________________________  Physician/APC Signature